# Patient Record
Sex: FEMALE | Race: WHITE | NOT HISPANIC OR LATINO | Employment: FULL TIME | URBAN - NONMETROPOLITAN AREA
[De-identification: names, ages, dates, MRNs, and addresses within clinical notes are randomized per-mention and may not be internally consistent; named-entity substitution may affect disease eponyms.]

---

## 2023-08-10 LAB
CHLAMYDIA TRACH., AMPLIFIED: NEGATIVE
N. GONORRHEA, AMPLIFIED: NEGATIVE

## 2023-12-04 ENCOUNTER — HOSPITAL ENCOUNTER (EMERGENCY)
Facility: HOSPITAL | Age: 28
Discharge: HOME | End: 2023-12-04
Payer: MEDICARE

## 2023-12-04 VITALS
HEIGHT: 63 IN | WEIGHT: 175 LBS | OXYGEN SATURATION: 99 % | BODY MASS INDEX: 31.01 KG/M2 | TEMPERATURE: 97.2 F | DIASTOLIC BLOOD PRESSURE: 76 MMHG | SYSTOLIC BLOOD PRESSURE: 113 MMHG | RESPIRATION RATE: 16 BRPM | HEART RATE: 78 BPM

## 2023-12-04 DIAGNOSIS — S61.211A LACERATION OF LEFT INDEX FINGER WITHOUT FOREIGN BODY WITHOUT DAMAGE TO NAIL, INITIAL ENCOUNTER: ICD-10-CM

## 2023-12-04 DIAGNOSIS — T14.8XXA SKIN AVULSION: Primary | ICD-10-CM

## 2023-12-04 PROCEDURE — 99283 EMERGENCY DEPT VISIT LOW MDM: CPT | Mod: 25

## 2023-12-04 PROCEDURE — 99284 EMERGENCY DEPT VISIT MOD MDM: CPT

## 2023-12-04 PROCEDURE — 2500000004 HC RX 250 GENERAL PHARMACY W/ HCPCS (ALT 636 FOR OP/ED): Performed by: PHYSICIAN ASSISTANT

## 2023-12-04 PROCEDURE — 90715 TDAP VACCINE 7 YRS/> IM: CPT | Performed by: PHYSICIAN ASSISTANT

## 2023-12-04 PROCEDURE — 94760 N-INVAS EAR/PLS OXIMETRY 1: CPT

## 2023-12-04 PROCEDURE — 90471 IMMUNIZATION ADMIN: CPT | Performed by: PHYSICIAN ASSISTANT

## 2023-12-04 RX ORDER — LEVONORGESTREL 52 MG/1
1 INTRAUTERINE DEVICE INTRAUTERINE ONCE
COMMUNITY

## 2023-12-04 RX ORDER — CEPHALEXIN 500 MG/1
500 CAPSULE ORAL 2 TIMES DAILY
Qty: 20 CAPSULE | Refills: 0 | Status: SHIPPED | OUTPATIENT
Start: 2023-12-04 | End: 2023-12-14

## 2023-12-04 RX ADMIN — TETANUS TOXOID, REDUCED DIPHTHERIA TOXOID AND ACELLULAR PERTUSSIS VACCINE, ADSORBED 0.5 ML: 5; 2.5; 8; 8; 2.5 SUSPENSION INTRAMUSCULAR at 16:55

## 2023-12-04 ASSESSMENT — COLUMBIA-SUICIDE SEVERITY RATING SCALE - C-SSRS
1. IN THE PAST MONTH, HAVE YOU WISHED YOU WERE DEAD OR WISHED YOU COULD GO TO SLEEP AND NOT WAKE UP?: NO
6. HAVE YOU EVER DONE ANYTHING, STARTED TO DO ANYTHING, OR PREPARED TO DO ANYTHING TO END YOUR LIFE?: NO
2. HAVE YOU ACTUALLY HAD ANY THOUGHTS OF KILLING YOURSELF?: NO

## 2023-12-04 ASSESSMENT — PAIN SCALES - GENERAL: PAINLEVEL_OUTOF10: 2

## 2023-12-04 ASSESSMENT — PAIN - FUNCTIONAL ASSESSMENT: PAIN_FUNCTIONAL_ASSESSMENT: 0-10

## 2023-12-04 NOTE — Clinical Note
Echo Myers was seen and treated in our emergency department on 12/4/2023.  She may return to work on 12/06/2023.  Patient needs to keep left index finger clean and dry, minimal use of the left hand   Modified duty with minimal left hand use until 12/18/2023 (no picking up /lifting more than 1 lb in the left hand)      If you have any questions or concerns, please don't hesitate to call.      MARGOTH CaleroC

## 2023-12-04 NOTE — DISCHARGE INSTRUCTIONS
You have a skin avulsion to that right index finger  Keep the wound clean and dry change the dressing daily  And watch for any signs of infection including increased red streaking redness swelling to the tip of the finger or worsening pain

## 2023-12-04 NOTE — ED PROVIDER NOTES
HPI   Chief Complaint   Patient presents with    Finger Laceration       28-year-old female here for evaluation of left index finger laceration  She was using a brand-new mandolin cutting up some food in the finger got too close to the mandolin    Last tetanus unknown                          [place] Coma Scale Score: 15                  Patient History   No past medical history on file.  No past surgical history on file.  No family history on file.  Social History     Tobacco Use    Smoking status: Not on file    Smokeless tobacco: Not on file   Substance Use Topics    Alcohol use: Not on file    Drug use: Not on file       Physical Exam   ED Triage Vitals [12/04/23 1638]   Temp Heart Rate Resp BP   36.2 °C (97.2 °F) 78 16 113/76      SpO2 Temp Source Heart Rate Source Patient Position   99 % Temporal -- --      BP Location FiO2 (%)     -- --       Physical Exam  Vitals and nursing note reviewed.   Constitutional:       General: She is not in acute distress.     Appearance: Normal appearance. She is well-developed.   HENT:      Head: Normocephalic and atraumatic.   Eyes:      Conjunctiva/sclera: Conjunctivae normal.   Cardiovascular:      Rate and Rhythm: Normal rate and regular rhythm.      Heart sounds: No murmur heard.  Pulmonary:      Effort: Pulmonary effort is normal. No respiratory distress.      Breath sounds: Normal breath sounds.   Abdominal:      Palpations: Abdomen is soft.      Tenderness: There is no abdominal tenderness.   Musculoskeletal:         General: No swelling.      Cervical back: Neck supple.   Skin:     General: Skin is warm and dry.      Capillary Refill: Capillary refill takes less than 2 seconds.      Comments: Patient with skin avulsion to the distal volar tip of the left index finger  No nail involvement no foreign body noted  Area of skin avulsion is approximately 9-10 mm oval to the distal tip of the finger  No bony involvement   Neurological:      Mental Status: She is alert.    Psychiatric:         Mood and Affect: Mood normal.         ED Course & MDM   Diagnoses as of 12/04/23 1658   Skin avulsion   Laceration of left index finger without foreign body without damage to nail, initial encounter       Medical Decision Making  Area cleansed with Betadine then with normal saline antibiotic ointment and pressure dressing placed with koban  Updated patient's tetanus and will prescribe antibiotic prophylaxis    Skin avulsion no suturing or skin adhesive  Antibiotic ointment and pressure dressing applied patient tolerated well  Wound care instructions provided to patient with information on what to watch for for infection            Procedure  Procedures     Leonie Evangelista PA-C  12/04/23 1701       Leonie Evangelista PA-C  12/04/23 1702

## 2024-08-19 PROBLEM — G43.909 MIGRAINE: Status: ACTIVE | Noted: 2024-08-19

## 2024-08-19 PROBLEM — F41.8 MIXED ANXIETY AND DEPRESSIVE DISORDER: Status: ACTIVE | Noted: 2024-08-19

## 2024-08-19 PROBLEM — G56.00 CARPAL TUNNEL SYNDROME: Status: ACTIVE | Noted: 2024-08-19

## 2024-08-19 PROBLEM — K21.9 ESOPHAGEAL REFLUX: Status: ACTIVE | Noted: 2024-08-19

## 2024-08-19 NOTE — PROGRESS NOTES
Subjective   Patient ID: Echo Myers is a 28 y.o. female who presents for Cyst (Left wrist).    HPI   Pt comes in with 6 mos painful left wrist lump.  It has enlarged so now it hurts when lifting things or gripping things    Past Medical History:   Diagnosis Date    Beta thalassemia trait 11/01/2013    Carpal tunnel syndrome 08/19/2024    Esophageal reflux 08/19/2024    Maternal thalassemia complicating pregnancy ([facility]) 11/01/2013    Migraine 08/19/2024    Mixed anxiety and depressive disorder 08/19/2024    Splenomegaly 06/14/2016     No current outpatient medications on file.     No current facility-administered medications for this visit.       Review of Systems see hpi    Objective   /82   Pulse 88   Wt 75.2 kg (165 lb 12.8 oz)   LMP 07/29/2024 (Exact Date)   SpO2 100%   BMI 29.37 kg/m²     Physical Exam  Vitals reviewed.   Constitutional:       Appearance: Normal appearance.   Skin:     Comments: Left wrist palmar side is a 1 cm ganglion cyst at radial side.  Slightly tender when compressed.   Neurological:      Mental Status: She is alert.         Assessment/Plan   Problem List Items Addressed This Visit    None  Visit Diagnoses         Codes    Ganglion cyst of wrist, left    -  Primary M67.432    Relevant Orders    Referral to Orthopaedic Surgery

## 2024-08-22 ENCOUNTER — OFFICE VISIT (OUTPATIENT)
Dept: PRIMARY CARE | Facility: CLINIC | Age: 29
End: 2024-08-22
Payer: MEDICARE

## 2024-08-22 VITALS
WEIGHT: 165.8 LBS | SYSTOLIC BLOOD PRESSURE: 128 MMHG | HEART RATE: 88 BPM | BODY MASS INDEX: 29.37 KG/M2 | DIASTOLIC BLOOD PRESSURE: 82 MMHG | OXYGEN SATURATION: 100 %

## 2024-08-22 DIAGNOSIS — M67.432 GANGLION CYST OF WRIST, LEFT: Primary | ICD-10-CM

## 2024-08-22 PROCEDURE — 99213 OFFICE O/P EST LOW 20 MIN: CPT | Performed by: FAMILY MEDICINE

## 2024-08-22 ASSESSMENT — PATIENT HEALTH QUESTIONNAIRE - PHQ9
2. FEELING DOWN, DEPRESSED OR HOPELESS: NOT AT ALL
SUM OF ALL RESPONSES TO PHQ9 QUESTIONS 1 AND 2: 0
1. LITTLE INTEREST OR PLEASURE IN DOING THINGS: NOT AT ALL

## 2024-08-22 ASSESSMENT — PAIN SCALES - GENERAL: PAINLEVEL: 0-NO PAIN

## 2024-12-27 ENCOUNTER — TELEPHONE (OUTPATIENT)
Dept: PRIMARY CARE | Facility: CLINIC | Age: 29
End: 2024-12-27
Payer: MEDICARE

## 2024-12-27 ENCOUNTER — LAB (OUTPATIENT)
Dept: LAB | Facility: LAB | Age: 29
End: 2024-12-27
Payer: MEDICARE

## 2024-12-27 DIAGNOSIS — N91.2 AMENORRHEA: ICD-10-CM

## 2024-12-27 DIAGNOSIS — N91.2 AMENORRHEA: Primary | ICD-10-CM

## 2024-12-27 LAB — B-HCG SERPL-ACNC: <2 MIU/ML

## 2024-12-27 PROCEDURE — 84702 CHORIONIC GONADOTROPIN TEST: CPT

## 2024-12-27 NOTE — TELEPHONE ENCOUNTER
Pt called to ask if you could order a HCG test.   She has been tracking her periods and is usually 24-26 day cycle.  She is on day 33 and still no period.  She usually does not have cramps and she is having slight cramping now.  She has taken several pregnancy tests and all are negative.  She is concerned about an ectopic pregnancy and would like the blood test to confirm.  I encouraged her to see/schedule a visit with her OB/GYN, whom she hasn't seen in over 1 1/2 years.  Please advise.